# Patient Record
Sex: FEMALE | Race: WHITE | URBAN - METROPOLITAN AREA
[De-identification: names, ages, dates, MRNs, and addresses within clinical notes are randomized per-mention and may not be internally consistent; named-entity substitution may affect disease eponyms.]

---

## 2020-06-26 ENCOUNTER — OFFICE VISIT (OUTPATIENT)
Dept: URBAN - METROPOLITAN AREA SURGERY CENTER 18 | Facility: SURGERY CENTER | Age: 60
End: 2020-06-26

## 2020-07-17 ENCOUNTER — OFFICE VISIT (OUTPATIENT)
Dept: URBAN - METROPOLITAN AREA SURGERY CENTER 18 | Facility: SURGERY CENTER | Age: 60
End: 2020-07-17

## 2021-07-20 ENCOUNTER — LAB OUTSIDE AN ENCOUNTER (OUTPATIENT)
Dept: URBAN - METROPOLITAN AREA CLINIC 96 | Facility: CLINIC | Age: 61
End: 2021-07-20

## 2021-07-21 ENCOUNTER — OFFICE VISIT (OUTPATIENT)
Dept: URBAN - METROPOLITAN AREA CLINIC 96 | Facility: CLINIC | Age: 61
End: 2021-07-21
Payer: COMMERCIAL

## 2021-07-21 ENCOUNTER — WEB ENCOUNTER (OUTPATIENT)
Dept: URBAN - METROPOLITAN AREA CLINIC 96 | Facility: CLINIC | Age: 61
End: 2021-07-21

## 2021-07-21 DIAGNOSIS — K59.00 CONSTIPATION, UNSPECIFIED CONSTIPATION TYPE: ICD-10-CM

## 2021-07-21 DIAGNOSIS — Z86.010 PERSONAL HISTORY OF COLONIC POLYPS: ICD-10-CM

## 2021-07-21 PROBLEM — 14760008: Status: ACTIVE | Noted: 2021-07-21

## 2021-07-21 PROCEDURE — 99203 OFFICE O/P NEW LOW 30 MIN: CPT | Performed by: INTERNAL MEDICINE

## 2021-07-21 RX ORDER — ONDANSETRON HYDROCHLORIDE 4 MG/1
1 TABLET AS NEEDED TABLET, FILM COATED ORAL ONCE A DAY
Qty: 2 | Refills: 0 | OUTPATIENT
Start: 2021-07-20

## 2021-07-21 RX ORDER — SODIUM, POTASSIUM,MAG SULFATES 17.5-3.13G
PLEASE FOLLOW DIRECTIONS FOR TIMING OF TAKING THE DOSES IN SPLIT PREP SOLUTION, RECONSTITUTED, ORAL ORAL AS DIRECTED
Qty: 177 MILLILITER | Refills: 0 | OUTPATIENT
Start: 2021-07-20 | End: 2021-07-21

## 2021-07-21 NOTE — HPI-TODAY'S VISIT:
This is a 61-year-old female referred by Dr. Ellen Alvarado for colon cancer screening and a copy this note be sent to the referring provider.  Patient was seen in the office in January 2020 at which time she wanted to set up a colonoscopy due to a previous history of polyps.  Patient had a colonoscopy she thought in 2013 that revealed a transverse colon polyp.  The colonoscopy was done on September 9, 2013 by Dr. Albino Cordoba in [place] this revealed internal hemorrhoids and an 8 mm transverse colon polyp that was removed.  He recommended repeat exam in 3-5 years.  I did not have the pathology of the polyp.  Patient stated that she did have some constipation with calcium intake.  We ordered a colonoscopy but due to Covid restrictions the procedure was not done. Pt is going once a day.

## 2021-11-05 ENCOUNTER — OFFICE VISIT (OUTPATIENT)
Dept: URBAN - METROPOLITAN AREA SURGERY CENTER 18 | Facility: SURGERY CENTER | Age: 61
End: 2021-11-05

## 2021-11-30 ENCOUNTER — TELEPHONE ENCOUNTER (OUTPATIENT)
Dept: URBAN - METROPOLITAN AREA CLINIC 96 | Facility: CLINIC | Age: 61
End: 2021-11-30

## 2021-11-30 RX ORDER — SODIUM, POTASSIUM,MAG SULFATES 17.5-3.13G
SOLUTION, RECONSTITUTED, ORAL ORAL AS DIRECTED
Qty: 177 MILLILITER | Refills: 0 | OUTPATIENT
Start: 2021-11-30 | End: 2021-12-01

## 2021-11-30 RX ORDER — ONDANSETRON HYDROCHLORIDE 4 MG/1
1 TABLET AS NEEDED TABLET, FILM COATED ORAL ONCE A DAY
Qty: 2 | Refills: 0 | OUTPATIENT
Start: 2021-11-30

## 2021-12-10 ENCOUNTER — LAB OUTSIDE AN ENCOUNTER (OUTPATIENT)
Dept: URBAN - METROPOLITAN AREA CLINIC 92 | Facility: CLINIC | Age: 61
End: 2021-12-10

## 2021-12-10 ENCOUNTER — OFFICE VISIT (OUTPATIENT)
Dept: URBAN - METROPOLITAN AREA SURGERY CENTER 18 | Facility: SURGERY CENTER | Age: 61
End: 2021-12-10

## 2021-12-10 ENCOUNTER — TELEPHONE ENCOUNTER (OUTPATIENT)
Dept: URBAN - METROPOLITAN AREA CLINIC 92 | Facility: CLINIC | Age: 61
End: 2021-12-10

## 2021-12-10 RX ORDER — ONDANSETRON HYDROCHLORIDE 4 MG/1
1 TABLET AS NEEDED TABLET, FILM COATED ORAL ONCE A DAY
Qty: 2 | Refills: 0 | Status: ACTIVE | COMMUNITY
Start: 2021-11-30

## 2021-12-10 RX ORDER — ONDANSETRON HYDROCHLORIDE 4 MG/1
1 TABLET AS NEEDED TABLET, FILM COATED ORAL TWICE A DAY
Qty: 4 TABLET | Refills: 0 | OUTPATIENT
Start: 2021-12-10

## 2021-12-10 RX ORDER — ONDANSETRON HYDROCHLORIDE 4 MG/1
1 TABLET AS NEEDED TABLET, FILM COATED ORAL ONCE A DAY
Qty: 2 | Refills: 0 | Status: ACTIVE | COMMUNITY
Start: 2021-07-20

## 2021-12-10 RX ORDER — POLYETHYLENE GLYOCOL 3350, SODIUM CHLORIDE, SODIUM BICARBONATE AND POTASSIUM CHLORIDE 420; 11.2; 5.72; 1.48 G/4L; G/4L; G/4L; G/4L
AS DIRECTED POWDER, FOR SOLUTION NASOGASTRIC; ORAL AS DIRECTED
Qty: 420 GRAM | Refills: 0 | OUTPATIENT
Start: 2021-12-10 | End: 2021-12-11

## 2022-03-25 ENCOUNTER — WEB ENCOUNTER (OUTPATIENT)
Dept: URBAN - METROPOLITAN AREA SURGERY CENTER 18 | Facility: SURGERY CENTER | Age: 62
End: 2022-03-25

## 2022-03-28 ENCOUNTER — TELEPHONE ENCOUNTER (OUTPATIENT)
Dept: URBAN - METROPOLITAN AREA CLINIC 98 | Facility: CLINIC | Age: 62
End: 2022-03-28

## 2022-03-30 ENCOUNTER — OFFICE VISIT (OUTPATIENT)
Dept: URBAN - METROPOLITAN AREA SURGERY CENTER 18 | Facility: SURGERY CENTER | Age: 62
End: 2022-03-30
Payer: COMMERCIAL

## 2022-03-30 ENCOUNTER — CLAIMS CREATED FROM THE CLAIM WINDOW (OUTPATIENT)
Dept: URBAN - METROPOLITAN AREA CLINIC 4 | Facility: CLINIC | Age: 62
End: 2022-03-30
Payer: COMMERCIAL

## 2022-03-30 ENCOUNTER — OFFICE VISIT (OUTPATIENT)
Dept: URBAN - METROPOLITAN AREA SURGERY CENTER 18 | Facility: SURGERY CENTER | Age: 62
End: 2022-03-30

## 2022-03-30 DIAGNOSIS — K63.5 BENIGN COLON POLYP: ICD-10-CM

## 2022-03-30 DIAGNOSIS — D12.5 BENIGN NEOPLASM OF SIGMOID COLON: ICD-10-CM

## 2022-03-30 DIAGNOSIS — D12.5 ADENOMA OF SIGMOID COLON: ICD-10-CM

## 2022-03-30 DIAGNOSIS — Z86.010 ADENOMAS PERSONAL HISTORY OF COLONIC POLYPS: ICD-10-CM

## 2022-03-30 DIAGNOSIS — K63.89 CYST OF DUODENUM: ICD-10-CM

## 2022-03-30 DIAGNOSIS — K63.89 BACTERIAL OVERGROWTH SYNDROME: ICD-10-CM

## 2022-03-30 PROCEDURE — G8907 PT DOC NO EVENTS ON DISCHARG: HCPCS | Performed by: INTERNAL MEDICINE

## 2022-03-30 PROCEDURE — 45380 COLONOSCOPY AND BIOPSY: CPT | Performed by: INTERNAL MEDICINE

## 2022-03-30 PROCEDURE — 45381 COLONOSCOPY SUBMUCOUS NJX: CPT | Performed by: INTERNAL MEDICINE

## 2022-03-30 PROCEDURE — 45385 COLONOSCOPY W/LESION REMOVAL: CPT | Performed by: INTERNAL MEDICINE

## 2022-03-30 PROCEDURE — 88305 TISSUE EXAM BY PATHOLOGIST: CPT | Performed by: PATHOLOGY

## 2022-04-05 ENCOUNTER — DASHBOARD ENCOUNTERS (OUTPATIENT)
Age: 62
End: 2022-04-05

## 2022-04-05 ENCOUNTER — LAB OUTSIDE AN ENCOUNTER (OUTPATIENT)
Dept: URBAN - METROPOLITAN AREA TELEHEALTH 2 | Facility: TELEHEALTH | Age: 62
End: 2022-04-05

## 2022-04-05 ENCOUNTER — TELEPHONE ENCOUNTER (OUTPATIENT)
Dept: URBAN - METROPOLITAN AREA CLINIC 92 | Facility: CLINIC | Age: 62
End: 2022-04-05

## 2022-04-05 ENCOUNTER — OFFICE VISIT (OUTPATIENT)
Dept: URBAN - METROPOLITAN AREA TELEHEALTH 2 | Facility: TELEHEALTH | Age: 62
End: 2022-04-05
Payer: COMMERCIAL

## 2022-04-05 DIAGNOSIS — R76.8 POSITIVE AUTOANTIBODY SCREENING FOR CELIAC DISEASE: ICD-10-CM

## 2022-04-05 DIAGNOSIS — K59.04 CHRONIC IDIOPATHIC CONSTIPATION: ICD-10-CM

## 2022-04-05 DIAGNOSIS — Z86.010 PERSONAL HISTORY OF COLONIC POLYPS: ICD-10-CM

## 2022-04-05 PROBLEM — 82934008: Status: ACTIVE | Noted: 2022-04-05

## 2022-04-05 PROCEDURE — 99204 OFFICE O/P NEW MOD 45 MIN: CPT | Performed by: INTERNAL MEDICINE

## 2022-04-05 PROCEDURE — 99214 OFFICE O/P EST MOD 30 MIN: CPT | Performed by: INTERNAL MEDICINE

## 2022-04-05 RX ORDER — ONDANSETRON HYDROCHLORIDE 4 MG/1
1 TABLET AS NEEDED TABLET, FILM COATED ORAL ONCE A DAY
Qty: 2 | Refills: 0 | Status: ACTIVE | COMMUNITY
Start: 2021-07-20

## 2022-04-05 RX ORDER — ONDANSETRON HYDROCHLORIDE 4 MG/1
1 TABLET AS NEEDED TABLET, FILM COATED ORAL ONCE A DAY
Qty: 2 | Refills: 0 | Status: ACTIVE | COMMUNITY
Start: 2021-11-30

## 2022-04-05 RX ORDER — ONDANSETRON HYDROCHLORIDE 4 MG/1
1 TABLET AS NEEDED TABLET, FILM COATED ORAL TWICE A DAY
Qty: 4 TABLET | Refills: 0 | Status: ACTIVE | COMMUNITY
Start: 2021-12-10

## 2022-04-05 NOTE — HPI-TODAY'S VISIT:
This is a 61-year-old female referred by Dr. Ellen Alvarado for f/u after her colon done for colon cancer screening and a copy this note be sent to the referring provider.  Pt had a prior colon on September 9, 2013 by Dr. Albino Cordoba in [place] this revealed internal hemorrhoids and an 8 mm transverse colon polyp that was removed.  He recommended repeat exam in 3-5 years.  I did not have the pathology of the polyp.  Patient stated that she did have some constipation with calcium intake.  We ordered a colonoscopy but the prep did not work so the first exam was cancelled and she did a 2 day constipation prep. She had a colonoscopy on March 30, 2022 with a 2-day prep.  This exam revealed internal hemorrhoids, 4 mm cecal polyp that was removed, 5 mm ascending colon polyp that was removed, 8 mm hepatic flexure polyp that was removed, 2 sigmoid polyps that removed and a 12 mm polyp that was at the junction of the transverse and descending colon and could not be removed due to position but was tattooed.  She had a moderately redundant colon.  The prep however was much better this time with a 2-day prep.  Pathology revealed hyperplastic polyps for all the polyps were removed except she had a sessile serrated adenoma of the sigmoid.  She now returns for telehealth follow-up after the procedure. Pt had lost feeling in her feet a while ago so has been seeing a neuro and had lots of bloodwork and said told her a celiac test was positive. Pt had small fiber neuropathy. The tTG- IGA <2, tTG IGG 13. Pt started a gluten free diet. Pt works on her constipation- eats tons of fiber and veggie and fruits and grains. She drinks a ton of water.

## 2022-04-07 ENCOUNTER — TELEPHONE ENCOUNTER (OUTPATIENT)
Dept: URBAN - METROPOLITAN AREA CLINIC 96 | Facility: CLINIC | Age: 62
End: 2022-04-07

## 2022-05-18 ENCOUNTER — CLAIMS CREATED FROM THE CLAIM WINDOW (OUTPATIENT)
Dept: URBAN - METROPOLITAN AREA CLINIC 4 | Facility: CLINIC | Age: 62
End: 2022-05-18
Payer: COMMERCIAL

## 2022-05-18 ENCOUNTER — CLAIMS CREATED FROM THE CLAIM WINDOW (OUTPATIENT)
Dept: URBAN - METROPOLITAN AREA SURGERY CENTER 18 | Facility: SURGERY CENTER | Age: 62
End: 2022-05-18

## 2022-05-18 ENCOUNTER — CLAIMS CREATED FROM THE CLAIM WINDOW (OUTPATIENT)
Dept: URBAN - METROPOLITAN AREA SURGERY CENTER 18 | Facility: SURGERY CENTER | Age: 62
End: 2022-05-18
Payer: COMMERCIAL

## 2022-05-18 DIAGNOSIS — K31.89 FOCAL FOVEOLAR HYPERPLASIA: ICD-10-CM

## 2022-05-18 DIAGNOSIS — K31.89 ACQUIRED DEFORMITY OF DUODENUM: ICD-10-CM

## 2022-05-18 DIAGNOSIS — K29.70 GASTRITIS, UNSPECIFIED, WITHOUT BLEEDING: ICD-10-CM

## 2022-05-18 DIAGNOSIS — K21.9 ACID REFLUX: ICD-10-CM

## 2022-05-18 DIAGNOSIS — K21.9 GASTRO-ESOPHAGEAL REFLUX DISEASE WITHOUT ESOPHAGITIS: ICD-10-CM

## 2022-05-18 PROCEDURE — G8907 PT DOC NO EVENTS ON DISCHARG: HCPCS | Performed by: INTERNAL MEDICINE

## 2022-05-18 PROCEDURE — 88305 TISSUE EXAM BY PATHOLOGIST: CPT | Performed by: PATHOLOGY

## 2022-05-18 PROCEDURE — 43239 EGD BIOPSY SINGLE/MULTIPLE: CPT | Performed by: INTERNAL MEDICINE

## 2022-05-18 PROCEDURE — 88312 SPECIAL STAINS GROUP 1: CPT | Performed by: PATHOLOGY

## 2022-05-18 RX ORDER — ONDANSETRON HYDROCHLORIDE 4 MG/1
1 TABLET AS NEEDED TABLET, FILM COATED ORAL TWICE A DAY
Qty: 4 TABLET | Refills: 0 | Status: ACTIVE | COMMUNITY
Start: 2021-12-10

## 2022-05-18 RX ORDER — ONDANSETRON HYDROCHLORIDE 4 MG/1
1 TABLET AS NEEDED TABLET, FILM COATED ORAL ONCE A DAY
Qty: 2 | Refills: 0 | Status: ACTIVE | COMMUNITY
Start: 2021-11-30

## 2022-05-18 RX ORDER — ONDANSETRON HYDROCHLORIDE 4 MG/1
1 TABLET AS NEEDED TABLET, FILM COATED ORAL ONCE A DAY
Qty: 2 | Refills: 0 | Status: ACTIVE | COMMUNITY
Start: 2021-07-20

## 2022-06-02 ENCOUNTER — WEB ENCOUNTER (OUTPATIENT)
Dept: URBAN - METROPOLITAN AREA CLINIC 96 | Facility: CLINIC | Age: 62
End: 2022-06-02

## 2022-06-24 PROBLEM — 428283002: Status: ACTIVE | Noted: 2021-07-20

## 2022-07-06 ENCOUNTER — TELEPHONE ENCOUNTER (OUTPATIENT)
Dept: URBAN - METROPOLITAN AREA CLINIC 96 | Facility: CLINIC | Age: 62
End: 2022-07-06

## 2022-07-06 RX ORDER — POLYETHYLENE GLYOCOL 3350, SODIUM CHLORIDE, SODIUM BICARBONATE AND POTASSIUM CHLORIDE 420; 11.2; 5.72; 1.48 G/4L; G/4L; G/4L; G/4L
AS DIRECTED POWDER, FOR SOLUTION NASOGASTRIC; ORAL AS DIRECTED
Qty: 420 GRAM | Refills: 0 | OUTPATIENT
Start: 2022-07-06 | End: 2022-07-07

## 2022-07-06 RX ORDER — ONDANSETRON HYDROCHLORIDE 4 MG/1
1 TABLET AS NEEDED TABLET, FILM COATED ORAL TWICE A DAY
Qty: 4 TABLET | Refills: 0 | OUTPATIENT
Start: 2022-07-06

## 2022-07-13 ENCOUNTER — WEB ENCOUNTER (OUTPATIENT)
Dept: URBAN - METROPOLITAN AREA CLINIC 96 | Facility: CLINIC | Age: 62
End: 2022-07-13

## 2022-07-21 ENCOUNTER — OFFICE VISIT (OUTPATIENT)
Dept: URBAN - METROPOLITAN AREA MEDICAL CENTER 28 | Facility: MEDICAL CENTER | Age: 62
End: 2022-07-21
Payer: COMMERCIAL

## 2022-07-21 DIAGNOSIS — K63.5 BENIGN COLON POLYP: ICD-10-CM

## 2022-07-21 DIAGNOSIS — Z86.010 ADENOMAS PERSONAL HISTORY OF COLONIC POLYPS: ICD-10-CM

## 2022-07-21 PROCEDURE — 45385 COLONOSCOPY W/LESION REMOVAL: CPT | Performed by: INTERNAL MEDICINE

## 2022-07-21 PROCEDURE — 45381 COLONOSCOPY SUBMUCOUS NJX: CPT | Performed by: INTERNAL MEDICINE

## 2022-07-21 RX ORDER — ONDANSETRON HYDROCHLORIDE 4 MG/1
1 TABLET AS NEEDED TABLET, FILM COATED ORAL TWICE A DAY
Qty: 4 TABLET | Refills: 0 | Status: ACTIVE | COMMUNITY
Start: 2022-07-06

## 2022-07-21 RX ORDER — ONDANSETRON HYDROCHLORIDE 4 MG/1
1 TABLET AS NEEDED TABLET, FILM COATED ORAL ONCE A DAY
Qty: 2 | Refills: 0 | Status: ACTIVE | COMMUNITY
Start: 2021-07-20

## 2022-07-21 RX ORDER — ONDANSETRON HYDROCHLORIDE 4 MG/1
1 TABLET AS NEEDED TABLET, FILM COATED ORAL TWICE A DAY
Qty: 4 TABLET | Refills: 0 | Status: ACTIVE | COMMUNITY
Start: 2021-12-10

## 2022-07-21 RX ORDER — ONDANSETRON HYDROCHLORIDE 4 MG/1
1 TABLET AS NEEDED TABLET, FILM COATED ORAL ONCE A DAY
Qty: 2 | Refills: 0 | Status: ACTIVE | COMMUNITY
Start: 2021-11-30

## 2022-08-19 ENCOUNTER — WEB ENCOUNTER (OUTPATIENT)
Dept: URBAN - METROPOLITAN AREA CLINIC 92 | Facility: CLINIC | Age: 62
End: 2022-08-19

## 2025-03-19 ENCOUNTER — OFFICE VISIT (OUTPATIENT)
Dept: URBAN - METROPOLITAN AREA CLINIC 96 | Facility: CLINIC | Age: 65
End: 2025-03-19

## 2025-05-06 ENCOUNTER — LAB OUTSIDE AN ENCOUNTER (OUTPATIENT)
Dept: URBAN - METROPOLITAN AREA CLINIC 96 | Facility: CLINIC | Age: 65
End: 2025-05-06

## 2025-05-06 PROBLEM — 709489006: Status: ACTIVE | Noted: 2025-05-06

## 2025-05-07 ENCOUNTER — OFFICE VISIT (OUTPATIENT)
Dept: URBAN - METROPOLITAN AREA CLINIC 96 | Facility: CLINIC | Age: 65
End: 2025-05-07
Payer: MEDICARE

## 2025-05-07 DIAGNOSIS — Z86.0102 PERSONAL HISTORY OF HYPERPLASTIC COLON POLYPS: ICD-10-CM

## 2025-05-07 DIAGNOSIS — K59.04 CHRONIC IDIOPATHIC CONSTIPATION: ICD-10-CM

## 2025-05-07 DIAGNOSIS — G62.9 SMALL FIBER NEUROPATHY: ICD-10-CM

## 2025-05-07 DIAGNOSIS — R76.8 POSITIVE AUTOANTIBODY SCREENING FOR CELIAC DISEASE: ICD-10-CM

## 2025-05-07 PROCEDURE — 99204 OFFICE O/P NEW MOD 45 MIN: CPT | Performed by: INTERNAL MEDICINE

## 2025-05-07 PROCEDURE — 99214 OFFICE O/P EST MOD 30 MIN: CPT | Performed by: INTERNAL MEDICINE

## 2025-05-07 RX ORDER — ONDANSETRON HYDROCHLORIDE 4 MG/1
4 TABLETS TABLET, FILM COATED ORAL
Qty: 4 TABLETS | Refills: 0 | OUTPATIENT
Start: 2025-05-07

## 2025-05-07 RX ORDER — ESTRADIOL 10 UG/1
INSERT ONE TABLET VAGINALLY TWICE A WEEK ON MONDAY AND FRIDAY TABLET, FILM COATED VAGINAL
Qty: 24 UNSPECIFIED | Refills: 1 | Status: ACTIVE | COMMUNITY

## 2025-05-07 RX ORDER — POLYETHYLENE GLYCOL 3350, SODIUM SULFATE ANHYDROUS, SODIUM BICARBONATE, SODIUM CHLORIDE, POTASSIUM CHLORIDE 236; 22.74; 6.74; 5.86; 2.97 G/4L; G/4L; G/4L; G/4L; G/4L
AS DIRECTED POWDER, FOR SOLUTION ORAL
Qty: 4000 ML | Refills: 0 | OUTPATIENT
Start: 2025-05-07 | End: 2025-05-08

## 2025-05-07 RX ORDER — EZETIMIBE 10 MG/1
1 TABLET TABLET ORAL ONCE A DAY
Status: ACTIVE | COMMUNITY

## 2025-05-07 RX ORDER — ONDANSETRON HYDROCHLORIDE 4 MG/1
1 TABLET AS NEEDED TABLET, FILM COATED ORAL ONCE A DAY
Qty: 2 | Refills: 0 | Status: ON HOLD | COMMUNITY
Start: 2021-07-20

## 2025-05-07 NOTE — HPI-TODAY'S VISIT:
This is a 64-year-old female referred by Dr. Myriam Rojas and Dr Ellen Alvarado and a copy will be sent to the ref provider. Pt had a prior colon on September 9, 2013 by Dr. Albino Cordoba in [place] this revealed internal hemorrhoids and an 8 mm transverse colon polyp that was removed.  He recommended repeat exam in 3-5 years.  I did not have the pathology of the polyp.  Patient stated that she did have some constipation with calcium intake.  We ordered a colonoscopy but the prep did not work so the first exam was cancelled and she did a 2 day constipation prep. She had a colonoscopy on March 30, 2022 with a 2-day prep.  This exam revealed internal hemorrhoids, 4 mm cecal polyp that was removed, 5 mm ascending colon polyp that was removed, 8 mm hepatic flexure polyp that was removed, 2 sigmoid polyps that removed and a 12 mm polyp that was at the junction of the transverse and descending colon and could not be removed due to position but was tattooed.  She had a moderately redundant colon.  The prep however was much better this time with a 2-day prep.  Pathology revealed hyperplastic polyps for all the polyps were removed except she had a sessile serrated adenoma of the sigmoid. Pt had lost feeling in her feet a while ago so has been seeing a neuro and had lots of bloodwork and said told her a celiac test was positive. Pt had small fiber neuropathy. The tTG- IGA <2, tTG IGG 13. Pt started a gluten free diet. Pt works on her constipation- eats tons of fiber and veggie and fruits and grains. She drinks a ton of water.  Todays visit- Pt was seen in 2022 and I ord an EGD with me and colon with Dr Jordan with a 2 day constip prep. Patient has small fiber neuropathy and as part of the workup her neurologist did a celiac test that was positive.  For this reason I did an EGD on May 18, 2022 and this revealed no gross abnormalities but biopsies were sent.  Pathology showed no evidence of celiac, reactive gastropathy of the antrum, normal body biopsies and some reflux changes of the esophagus.  Patient had already taken gluten out of her diet.  She had a colonoscopy with Dr. Jordan at [facility] on July 21, 2022  (2 day constip prep) and he removed a 15 mm polyp in the descending colon with a EMR.  Pathology was a hyperplastic polyp.  He had recommended follow-up exam in 3 years or July 2025. Pt goes to the bathroom daily when home but when she travels and can go a week without a BM. Pt is not gluten free. No cardiopulm issues. Broke her left wrist- has plate now.

## 2025-07-02 ENCOUNTER — OFFICE VISIT (OUTPATIENT)
Dept: URBAN - METROPOLITAN AREA CLINIC 96 | Facility: CLINIC | Age: 65
End: 2025-07-02

## 2025-07-17 ENCOUNTER — OFFICE VISIT (OUTPATIENT)
Dept: URBAN - METROPOLITAN AREA MEDICAL CENTER 28 | Facility: MEDICAL CENTER | Age: 65
End: 2025-07-17
Payer: MEDICARE

## 2025-07-17 DIAGNOSIS — K63.89 OTHER SPECIFIED DISEASES OF INTESTINE: ICD-10-CM

## 2025-07-17 DIAGNOSIS — Z86.0100 PERSONAL HISTORY OF COLON POLYPS, UNSPECIFIED: ICD-10-CM

## 2025-07-17 PROCEDURE — 0529F INTRVL 3/>YR PTS CLNSCP DOCD: CPT | Performed by: INTERNAL MEDICINE

## 2025-07-17 PROCEDURE — 45380 COLONOSCOPY AND BIOPSY: CPT | Performed by: INTERNAL MEDICINE

## 2025-07-17 RX ORDER — EZETIMIBE 10 MG/1
1 TABLET TABLET ORAL ONCE A DAY
Status: ACTIVE | COMMUNITY

## 2025-07-17 RX ORDER — ONDANSETRON HYDROCHLORIDE 4 MG/1
1 TABLET AS NEEDED TABLET, FILM COATED ORAL ONCE A DAY
Qty: 2 | Refills: 0 | Status: ON HOLD | COMMUNITY
Start: 2021-07-20

## 2025-07-17 RX ORDER — ESTRADIOL 10 UG/1
INSERT ONE TABLET VAGINALLY TWICE A WEEK ON MONDAY AND FRIDAY TABLET, FILM COATED VAGINAL
Qty: 24 UNSPECIFIED | Refills: 1 | Status: ACTIVE | COMMUNITY

## 2025-07-17 RX ORDER — ONDANSETRON HYDROCHLORIDE 4 MG/1
4 TABLETS TABLET, FILM COATED ORAL
Qty: 4 TABLETS | Refills: 0 | Status: ACTIVE | COMMUNITY
Start: 2025-05-07